# Patient Record
Sex: MALE | Race: WHITE | NOT HISPANIC OR LATINO | Employment: FULL TIME | URBAN - NONMETROPOLITAN AREA
[De-identification: names, ages, dates, MRNs, and addresses within clinical notes are randomized per-mention and may not be internally consistent; named-entity substitution may affect disease eponyms.]

---
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
## 2021-08-12 ENCOUNTER — OFFICE VISIT (OUTPATIENT)
Dept: INTERNAL MEDICINE | Facility: CLINIC | Age: 21
End: 2021-08-12

## 2021-08-12 VITALS
HEART RATE: 68 BPM | BODY MASS INDEX: 24.03 KG/M2 | DIASTOLIC BLOOD PRESSURE: 62 MMHG | SYSTOLIC BLOOD PRESSURE: 130 MMHG | HEIGHT: 72 IN | OXYGEN SATURATION: 100 % | WEIGHT: 177.4 LBS | RESPIRATION RATE: 15 BRPM

## 2021-08-12 DIAGNOSIS — F41.9 ANXIETY: Primary | ICD-10-CM

## 2021-08-12 PROCEDURE — 99213 OFFICE O/P EST LOW 20 MIN: CPT | Performed by: FAMILY MEDICINE

## 2021-08-12 NOTE — PROGRESS NOTES
"Ryne Braden is a 21 y.o. male.    Chief Complaint   Patient presents with   • Anxiety     pt is wanting to discuss anxiety and how to get emotional support animal, was seeing behavioral health has not been there since March of 2020       HPI   Patient reports that he is doing much better the last time he visited.  Patient still struggles with anxiety.  He reports overall feeling better.  Denies any feelings of hopelessness or worthlessness.  He reports in the morning when he wakes up he is alone.   He has a dreadful feeling about having to go work.  He denies anxiety attacks.  He reports feeling nervous and worried mostly in the mornings.  He is sleeping well.  Denies trouble getting to sleep.  He is wanting to get a cat.      The following portions of the patient's history were reviewed and updated as appropriate: allergies, current medications, past family history, past medical history, past social history, past surgical history and problem list.     No Known Allergies      Current Outpatient Medications:   •  albuterol sulfate  (90 Base) MCG/ACT inhaler, Inhale 2 puffs Every 4 (Four) Hours As Needed for Shortness of Air., Disp: 1 inhaler, Rfl: 2    ROS    Review of Systems   Constitutional: Negative for chills and fever.   HENT: Positive for rhinorrhea. Negative for congestion.    Eyes: Negative for discharge and itching.   Respiratory: Negative for cough and shortness of breath.    Cardiovascular: Negative for chest pain.   Gastrointestinal: Negative for abdominal pain, constipation, diarrhea, nausea and vomiting.   Psychiatric/Behavioral: Negative for sleep disturbance and depressed mood. The patient is nervous/anxious.        Vitals:    08/12/21 1337   BP: 130/62   BP Location: Left arm   Patient Position: Sitting   Cuff Size: Adult   Pulse: 68   Resp: 15   SpO2: 100%   Weight: 80.5 kg (177 lb 6.4 oz)   Height: 182.9 cm (72\")     Body mass index is 24.06 kg/m².    Physical Exam     Physical " Exam  Constitutional:       General: He is not in acute distress.     Appearance: Normal appearance. He is well-developed.   HENT:      Head: Normocephalic and atraumatic.      Right Ear: External ear normal.      Left Ear: External ear normal.   Eyes:      Extraocular Movements: Extraocular movements intact.      Conjunctiva/sclera: Conjunctivae normal.   Cardiovascular:      Rate and Rhythm: Normal rate and regular rhythm.      Heart sounds: No murmur heard.     Pulmonary:      Effort: Pulmonary effort is normal. No respiratory distress.      Breath sounds: Normal breath sounds. No wheezing.   Abdominal:      General: Bowel sounds are normal. There is no distension.      Palpations: Abdomen is soft.      Tenderness: There is no abdominal tenderness.   Musculoskeletal:      Right lower leg: No edema.      Left lower leg: No edema.   Skin:     General: Skin is warm and dry.   Neurological:      Mental Status: He is alert and oriented to person, place, and time.      Cranial Nerves: No cranial nerve deficit.   Psychiatric:         Mood and Affect: Mood normal.         Behavior: Behavior normal.         Assessment/Plan    Problems Addressed this Visit        Mental Health    Anxiety - Primary     Doing better than previous visits.  However, patient is now living on his own and would benefit from having a pet to provide emotional support.  Letter has been provided to patient today.           Diagnoses       Codes Comments    Anxiety    -  Primary ICD-10-CM: F41.9  ICD-9-CM: 300.00           No orders of the defined types were placed in this encounter.      No orders of the defined types were placed in this encounter.      Return if symptoms worsen or fail to improve.    Ana Newman, DO

## 2021-08-13 NOTE — ASSESSMENT & PLAN NOTE
Doing better than previous visits.  However, patient is now living on his own and would benefit from having a pet to provide emotional support.  Letter has been provided to patient today.

## 2021-08-16 ENCOUNTER — TELEPHONE (OUTPATIENT)
Dept: INTERNAL MEDICINE | Facility: CLINIC | Age: 21
End: 2021-08-16

## 2021-08-16 NOTE — TELEPHONE ENCOUNTER
Caller: Ryne Braden    Relationship: Self    Best call back number: [phone]    What was the call regarding: PATIENT IS CALLING TO SEE IF THE PAPERWORK HE BROUGHT 06/13 IS AVAILABLE TO BE PICKED BACK UP.    Do you require a callback: YES

## 2021-08-17 NOTE — TELEPHONE ENCOUNTER
Notified pt that paperwork was ready and he did come by and . This was brought in to us 8/13/21, not 6/13/21.

## 2023-09-12 ENCOUNTER — TELEPHONE (OUTPATIENT)
Dept: INTERNAL MEDICINE | Facility: CLINIC | Age: 23
End: 2023-09-12

## 2023-09-12 NOTE — TELEPHONE ENCOUNTER
Caller: Ryne Braden    Relationship: Self    Best call back number: [phone]     What form or medical record are you requesting: SERVICE ANIMAL PAPERWORK     Who is requesting this form or medical record from you: [employer]    How would you like to receive the form or medical records (pick-up, mail, fax): PICKUP    Timeframe paperwork needed: AS SOON AS POSSIBLE

## 2023-09-13 NOTE — TELEPHONE ENCOUNTER
Left VM advising patient that we have not seen him in the office since 2021 and he would need to call and schedule an appointment to receive letter for emotional support animal.